# Patient Record
Sex: FEMALE | Race: WHITE | Employment: OTHER
[De-identification: names, ages, dates, MRNs, and addresses within clinical notes are randomized per-mention and may not be internally consistent; named-entity substitution may affect disease eponyms.]

---

## 2020-10-01 ENCOUNTER — OFFICE VISIT (OUTPATIENT)
Age: 85
End: 2020-10-01

## 2021-02-04 ENCOUNTER — OFFICE VISIT (OUTPATIENT)
Dept: URBAN - METROPOLITAN AREA CLINIC 7 | Facility: CLINIC | Age: 86
End: 2021-02-04

## 2021-03-01 ENCOUNTER — TELEPHONE ENCOUNTER (OUTPATIENT)
Dept: URBAN - METROPOLITAN AREA CLINIC 9 | Facility: CLINIC | Age: 86
End: 2021-03-01

## 2021-03-02 ENCOUNTER — TELEPHONE ENCOUNTER (OUTPATIENT)
Dept: URBAN - METROPOLITAN AREA CLINIC 9 | Facility: CLINIC | Age: 86
End: 2021-03-02

## 2022-07-30 ENCOUNTER — TELEPHONE ENCOUNTER (OUTPATIENT)
Age: 87
End: 2022-07-30

## 2022-07-30 RX ORDER — CHOLESTYRAMINE 4 G/9G
1 (ONE) POWDER, FOR SUSPENSION ORAL
Qty: 0 | Refills: 2 | OUTPATIENT
Start: 2021-03-01 | End: 2021-03-31

## 2022-07-31 ENCOUNTER — TELEPHONE ENCOUNTER (OUTPATIENT)
Age: 87
End: 2022-07-31

## 2022-07-31 RX ORDER — CHOLESTYRAMINE 4 G/9G
1 (ONE) POWDER, FOR SUSPENSION ORAL
Qty: 0 | Refills: 2 | Status: ACTIVE | COMMUNITY
Start: 2021-03-01

## 2022-08-18 ENCOUNTER — DASHBOARD ENCOUNTERS (OUTPATIENT)
Age: 87
End: 2022-08-18

## 2022-08-18 ENCOUNTER — WEB ENCOUNTER (OUTPATIENT)
Dept: URBAN - METROPOLITAN AREA CLINIC 7 | Facility: CLINIC | Age: 87
End: 2022-08-18

## 2022-08-18 ENCOUNTER — OFFICE VISIT (OUTPATIENT)
Dept: URBAN - METROPOLITAN AREA CLINIC 7 | Facility: CLINIC | Age: 87
End: 2022-08-18
Payer: COMMERCIAL

## 2022-08-18 ENCOUNTER — OFFICE VISIT (OUTPATIENT)
Dept: URBAN - METROPOLITAN AREA CLINIC 7 | Facility: CLINIC | Age: 87
End: 2022-08-18

## 2022-08-18 VITALS
SYSTOLIC BLOOD PRESSURE: 142 MMHG | HEIGHT: 62 IN | WEIGHT: 151 LBS | TEMPERATURE: 98 F | DIASTOLIC BLOOD PRESSURE: 62 MMHG | BODY MASS INDEX: 27.79 KG/M2

## 2022-08-18 DIAGNOSIS — K80.50 CHOLEDOCHOLITHIASIS: ICD-10-CM

## 2022-08-18 DIAGNOSIS — R74.8 ELEVATED LIVER ENZYMES: ICD-10-CM

## 2022-08-18 DIAGNOSIS — K86.2 PANCREATIC CYST: ICD-10-CM

## 2022-08-18 PROBLEM — 707724006 ELEVATED LIVER ENZYMES LEVEL: Status: ACTIVE | Noted: 2022-08-18

## 2022-08-18 PROBLEM — 274770006: Status: ACTIVE | Noted: 2022-08-18

## 2022-08-18 PROBLEM — 307132003: Status: ACTIVE | Noted: 2022-08-18

## 2022-08-18 PROCEDURE — 99214 OFFICE O/P EST MOD 30 MIN: CPT | Performed by: STUDENT IN AN ORGANIZED HEALTH CARE EDUCATION/TRAINING PROGRAM

## 2022-08-18 RX ORDER — CLOTRIMAZOLE 10 MG/G
1 APPLICATION CREAM TOPICAL TWICE A DAY
Status: ACTIVE | COMMUNITY
Start: 2022-08-18 | End: 2022-09-15

## 2022-08-18 RX ORDER — SERTRALINE HYDROCHLORIDE 100 MG/1
1 TABLET TABLET, FILM COATED ORAL ONCE A DAY
Qty: 30 | Status: ACTIVE | COMMUNITY
Start: 2022-08-18

## 2022-08-18 RX ORDER — ATORVASTATIN CALCIUM 40 MG/1
TABLET, FILM COATED ORAL
Qty: 90 TABLET | Status: ACTIVE | COMMUNITY

## 2022-08-18 RX ORDER — LEVOTHYROXINE SODIUM 88 UG/1
TABLET ORAL
Qty: 90 TABLET | Status: ACTIVE | COMMUNITY

## 2022-08-18 RX ORDER — CHOLESTYRAMINE 4 G/9G
1 (ONE) POWDER, FOR SUSPENSION ORAL
Qty: 0 | Refills: 2 | Status: ACTIVE | COMMUNITY
Start: 2021-03-01

## 2022-08-18 RX ORDER — OXYCODONE HYDROCHLORIDE 5 MG/1
TAKE ONE TABLET BY MOUTH EVERY 6 HOURS AS NEEDED TABLET ORAL
Qty: 10 UNSPECIFIED | Refills: 0 | Status: ACTIVE | COMMUNITY

## 2022-08-18 RX ORDER — CARVEDILOL 25 MG/1
TABLET, FILM COATED ORAL
Qty: 180 TABLET | Status: ACTIVE | COMMUNITY

## 2022-08-18 RX ORDER — AMLODIPINE BESYLATE 5 MG/1
TABLET ORAL
Qty: 90 TABLET | Status: ACTIVE | COMMUNITY

## 2022-08-18 RX ORDER — LOSARTAN POTASSIUM 25 MG/1
TABLET, FILM COATED ORAL
Qty: 90 TABLET | Status: ACTIVE | COMMUNITY

## 2022-08-18 RX ORDER — MEMANTINE HYDROCHLORIDE 10 MG/1
1 TABLET TABLET ORAL TWICE A DAY
Qty: 60 | Status: ACTIVE | COMMUNITY
Start: 2022-08-18

## 2022-08-18 NOTE — HPI-TODAY'S VISIT:
87 YO Female originally presenting for abnormal liver enzymes. Current medication use is initial presentation of symptoms. The abnormal liver enzymes is characterized as a elevated Alk Phos and transaminases. The Onset of the abnormal liver enzymes has been gradual. The abnormal liver enzymes has been occurring for 2 weeks. The abnormal liver enzymes has been occurring in a persistent pattern. The abnormal liver enzymes is aggravated by lipid-lowering agent. There has been no associated IV drug use, excessive alcohol use, arthralgias, ascites, depression, diabetes mellitus, drug (medication) intake, history of seizures, jaundice, known liver disease, previous need for transfusions, start of a new medication, acetaminophen ingestion, cholecystitis, cholelithiasis, obesity, morbid obesity, surgery, liver transplantation, sickle cell disease, viral hepatitis, primary biliary cirrhosis, primary sclerosing cholangitis, pancreatitis, hypotension (shock) or anesthesia exposure. Symptoms have been associated with abdominal pain (periumbilical) and diarrhea (4-5 per day), while the symptoms have NOT been associated with heartburn, dysphagia, odynophagia, anorexia, nausea, vomiting, hematemesis, bloating, abdominal distention, constipation, change in bowel habits, change in caliber of stools, melena, hematochezia, weight loss, fever / chills, jaundice, brigid-colored stools, dysuria, frequency, hematuria, history of IBD, history of irritable bowel syndrome, history of peptic ulcer disease or history of diverticulitis.   During recent laboratory evaluation the patient found to have a mildly elevated amylase with normal liver enzymes.  She has had some occasional periumbilical abdominal pain for the past several weeks is also explains a new onset diarrhea.  An MRI back in May 2020 did reveal a pancreatic cyst.  In light of the elevated amylase with new onset abdominal pain I suggested repeat MRI of the pancreas.  I have also suggested stool studies including pancreatic elastase, FOBTI, ova and parasites and fat content.  ERCP 2019 - for choledocolithiasis with papillotomy with successful stone removal Prior surgeries have included hysterectomy. Previous testing has included ERCP, abdominal ultrasound, abdominal/pelvic CT scan, MRI and MRCP.  Interval History 8/18/22: Presents for follow up after stool tests.  Fecal Fat, O&P, FOBT all negative.  BMP wnl.  Previously with elevated ALP and amylase and history of pancreatic cyst for which MRI was recommended for surveillance.  Blood work reviewed with patient.  No complaints.  Had MRI done last week at [facility], however we are not able to obtain the MRI results.  Patient was waiting while records were tried to be otbained.  I told the patient that as soon as we have the results.  Recent blodo work reviewed with patient. LFT's alk phone wnl.  I later called the patient when the MRI scan report was obtained to let her know that the results was of a stable cyst.  I adised the patient that since the pancreatic cyst has been stable, we can discuss during the next visit whether she should continue to have surveillance MRI's

## (undated) LAB
ASSAY, ELASTASE, PANCREATIC, FECAL: (no result)
CRYPTOSPORIDUM/GIARDI, INFCT ANTIGEN: (no result)
FECES FAT/LIPIDS QUAL: (no result)
MEDICARE: FECAL-OCCULT BLOOD TEST: (no result)